# Patient Record
Sex: FEMALE | Race: WHITE | URBAN - METROPOLITAN AREA
[De-identification: names, ages, dates, MRNs, and addresses within clinical notes are randomized per-mention and may not be internally consistent; named-entity substitution may affect disease eponyms.]

---

## 2020-07-29 ENCOUNTER — NEW PATIENT (OUTPATIENT)
Dept: URBAN - METROPOLITAN AREA CLINIC 43 | Facility: CLINIC | Age: 40
End: 2020-07-29
Payer: COMMERCIAL

## 2020-07-29 DIAGNOSIS — H40.023 OPEN ANGLE WITH BORDERLINE FINDINGS, HIGH RISK, BILATERAL: ICD-10-CM

## 2020-07-29 DIAGNOSIS — R51 HEADACHE: Primary | ICD-10-CM

## 2020-07-29 PROCEDURE — 92133 CPTRZD OPH DX IMG PST SGM ON: CPT | Performed by: OPTOMETRIST

## 2020-07-29 PROCEDURE — 92004 COMPRE OPH EXAM NEW PT 1/>: CPT | Performed by: OPTOMETRIST

## 2020-07-29 ASSESSMENT — VISUAL ACUITY
OD: 20/20
OS: 20/20

## 2020-07-29 ASSESSMENT — INTRAOCULAR PRESSURE
OD: 25
OS: 26
OS: 24
OD: 21

## 2020-08-10 ENCOUNTER — FOLLOW UP ESTABLISHED (OUTPATIENT)
Dept: URBAN - METROPOLITAN AREA CLINIC 43 | Facility: CLINIC | Age: 40
End: 2020-08-10
Payer: COMMERCIAL

## 2020-08-10 PROCEDURE — 92083 EXTENDED VISUAL FIELD XM: CPT | Performed by: OPTOMETRIST

## 2020-08-10 PROCEDURE — 76514 ECHO EXAM OF EYE THICKNESS: CPT | Performed by: OPTOMETRIST

## 2020-08-10 PROCEDURE — 92012 INTRM OPH EXAM EST PATIENT: CPT | Performed by: OPTOMETRIST

## 2020-08-10 RX ORDER — LATANOPROST 50 UG/ML
0.005 % SOLUTION OPHTHALMIC
Qty: 1 | Refills: 3 | Status: INACTIVE
Start: 2020-08-10 | End: 2020-09-22

## 2020-08-10 ASSESSMENT — INTRAOCULAR PRESSURE
OS: 19
OD: 18

## 2020-09-22 ENCOUNTER — FOLLOW UP ESTABLISHED (OUTPATIENT)
Dept: URBAN - METROPOLITAN AREA CLINIC 43 | Facility: CLINIC | Age: 40
End: 2020-09-22
Payer: COMMERCIAL

## 2020-09-22 PROCEDURE — 92012 INTRM OPH EXAM EST PATIENT: CPT | Performed by: OPTOMETRIST

## 2020-09-22 RX ORDER — LATANOPROST 50 UG/ML
0.005 % SOLUTION OPHTHALMIC
Qty: 3 | Refills: 2 | Status: INACTIVE
Start: 2020-09-22 | End: 2021-01-08

## 2020-09-22 ASSESSMENT — INTRAOCULAR PRESSURE
OD: 17
OS: 19

## 2021-01-08 ENCOUNTER — FOLLOW UP ESTABLISHED (OUTPATIENT)
Dept: URBAN - METROPOLITAN AREA CLINIC 43 | Facility: CLINIC | Age: 41
End: 2021-01-08
Payer: COMMERCIAL

## 2021-01-08 DIAGNOSIS — H52.13 MYOPIA, BILATERAL: ICD-10-CM

## 2021-01-08 PROCEDURE — 92012 INTRM OPH EXAM EST PATIENT: CPT | Performed by: OPTOMETRIST

## 2021-01-08 RX ORDER — LATANOPROST 50 UG/ML
0.005 % SOLUTION OPHTHALMIC
Qty: 7.5 | Refills: 1 | Status: INACTIVE
Start: 2021-01-08 | End: 2021-07-08

## 2021-01-08 ASSESSMENT — INTRAOCULAR PRESSURE
OD: 20
OS: 19

## 2021-01-28 ENCOUNTER — FOLLOW UP ESTABLISHED (OUTPATIENT)
Dept: URBAN - METROPOLITAN AREA CLINIC 43 | Facility: CLINIC | Age: 41
End: 2021-01-28
Payer: COMMERCIAL

## 2021-01-28 PROCEDURE — 92014 COMPRE OPH EXAM EST PT 1/>: CPT | Performed by: OPTOMETRIST

## 2021-01-28 PROCEDURE — 92133 CPTRZD OPH DX IMG PST SGM ON: CPT | Performed by: OPTOMETRIST

## 2021-01-28 ASSESSMENT — INTRAOCULAR PRESSURE
OD: 16
OS: 17

## 2021-02-12 ENCOUNTER — FOLLOW UP ESTABLISHED (OUTPATIENT)
Dept: URBAN - METROPOLITAN AREA CLINIC 43 | Facility: CLINIC | Age: 41
End: 2021-02-12
Payer: COMMERCIAL

## 2021-02-12 PROCEDURE — 92083 EXTENDED VISUAL FIELD XM: CPT | Performed by: OPTOMETRIST

## 2021-02-12 PROCEDURE — 92012 INTRM OPH EXAM EST PATIENT: CPT | Performed by: OPTOMETRIST

## 2021-02-12 ASSESSMENT — INTRAOCULAR PRESSURE
OS: 18
OD: 17

## 2021-07-08 ENCOUNTER — OFFICE VISIT (OUTPATIENT)
Dept: URBAN - METROPOLITAN AREA CLINIC 43 | Facility: CLINIC | Age: 41
End: 2021-07-08
Payer: COMMERCIAL

## 2021-07-08 DIAGNOSIS — D35.2 BENIGN NEOPLASM OF PITUITARY GLAND: ICD-10-CM

## 2021-07-08 DIAGNOSIS — H57.12 OCULAR PAIN, LEFT EYE: ICD-10-CM

## 2021-07-08 DIAGNOSIS — H02.421 MYOGENIC PTOSIS OF RIGHT EYELID: ICD-10-CM

## 2021-07-08 PROCEDURE — 92083 EXTENDED VISUAL FIELD XM: CPT | Performed by: OPTOMETRIST

## 2021-07-08 PROCEDURE — 92133 CPTRZD OPH DX IMG PST SGM ON: CPT | Performed by: OPTOMETRIST

## 2021-07-08 PROCEDURE — 99214 OFFICE O/P EST MOD 30 MIN: CPT | Performed by: OPTOMETRIST

## 2021-07-08 RX ORDER — LATANOPROST 50 UG/ML
0.005 % SOLUTION OPHTHALMIC
Qty: 7.5 | Refills: 1 | Status: ACTIVE
Start: 2021-07-08

## 2021-07-08 ASSESSMENT — KERATOMETRY
OD: 45.88
OS: 45.25

## 2021-07-08 ASSESSMENT — INTRAOCULAR PRESSURE
OD: 13
OS: 11

## 2021-07-08 NOTE — IMPRESSION/PLAN
Impression: Benign neoplasm of pituitary gland: D35.2. Plan: pt reports micro-adenoma, no sign of VF defects 2/2 compression of ONHs, cont.  care with neurology

## 2021-07-08 NOTE — IMPRESSION/PLAN
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  Plan: very mild, pt has h/o trigeminal neuroalgia s/p ablations, likely cause, will defer oculoplastics consult at this time

## 2022-06-13 ENCOUNTER — OFFICE VISIT (OUTPATIENT)
Dept: URBAN - METROPOLITAN AREA CLINIC 43 | Facility: CLINIC | Age: 42
End: 2022-06-13
Payer: COMMERCIAL

## 2022-06-13 DIAGNOSIS — H40.023 OPEN ANGLE WITH BORDERLINE FINDINGS, HIGH RISK, BILATERAL: Primary | ICD-10-CM

## 2022-06-13 DIAGNOSIS — G51.0 BELL'S PALSY: ICD-10-CM

## 2022-06-13 DIAGNOSIS — D35.2 BENIGN NEOPLASM OF PITUITARY GLAND: ICD-10-CM

## 2022-06-13 DIAGNOSIS — H57.12 OCULAR PAIN, LEFT EYE: ICD-10-CM

## 2022-06-13 PROCEDURE — 92133 CPTRZD OPH DX IMG PST SGM ON: CPT | Performed by: OPTOMETRIST

## 2022-06-13 PROCEDURE — 99214 OFFICE O/P EST MOD 30 MIN: CPT | Performed by: OPTOMETRIST

## 2022-06-13 PROCEDURE — 92083 EXTENDED VISUAL FIELD XM: CPT | Performed by: OPTOMETRIST

## 2022-06-13 RX ORDER — LATANOPROST 50 UG/ML
0.005 % SOLUTION OPHTHALMIC
Qty: 7.5 | Refills: 1 | Status: ACTIVE
Start: 2022-06-13

## 2022-06-13 ASSESSMENT — KERATOMETRY
OS: 46.13
OD: 46.00

## 2022-06-13 ASSESSMENT — INTRAOCULAR PRESSURE
OD: 16
OS: 17

## 2022-06-13 NOTE — IMPRESSION/PLAN
Impression: Benign neoplasm of pituitary gland: D35.2. Plan: pt reports micro-adenoma, no sign of VF defects 2/2 compression of ONHs (no bitemporal defects), dwp this can be detected on HVF24-2 as well as HVF30-2, cont.  care with neurology
pt still notes some mild eye pain OS (like pressure), recommend repeat MRI of brain and orbits, consider CT of sinuses

## 2022-06-13 NOTE — IMPRESSION/PLAN
Impression: Bell's palsy: G51.0.
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

## 2022-06-13 NOTE — IMPRESSION/PLAN
Impression: Ocular pain, left eye: H57.12. Plan: pt has history of sinus issues and sinus surgery, recommend f/u with ENT

## 2022-06-13 NOTE — IMPRESSION/PLAN
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 Plan: pachs thin to avg OU IOP 16/17 pn latanoprost qhs OU
large c/d's OU
OCT RNFL today:  OD: bdl inf nasal thin, OS: bdl inf nasal thin HVF today: reliable, mostly clear fields OU
cont. latanoprost qhs OU
IOP check in 6 months

## 2023-05-24 NOTE — IMPRESSION/PLAN
----- Message from Sawyer Mckenna DO sent at 5/24/2023  9:17 AM CDT -----  Please call patient regarding results. Please let her know that her wbc count has normalized. Her hgb is elevated, but this is likely secondary to smoking.    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 Plan: IOP today:  on latanoprost qhs OU (improved from pre-treatment)
pachs thin to avg OU IOP 13/11 of latanoprost qhs OU
large c/d's OU
OCT RNFL today: OD: inf temporal thin, OS: bdl sup thin, inf temporal thin HVF today: reliable OU, OD: clear fields OU
cont. latanoprost qhs OU
IOP check in 6 months